# Patient Record
Sex: FEMALE | URBAN - METROPOLITAN AREA
[De-identification: names, ages, dates, MRNs, and addresses within clinical notes are randomized per-mention and may not be internally consistent; named-entity substitution may affect disease eponyms.]

---

## 2023-04-13 ENCOUNTER — OFFICE VISIT (OUTPATIENT)
Facility: LOCATION | Age: 73
End: 2023-04-13
Payer: MEDICARE

## 2023-04-13 DIAGNOSIS — H04.123 DRY EYE SYNDROME OF BILATERAL LACRIMAL GLANDS: ICD-10-CM

## 2023-04-13 DIAGNOSIS — H26.493 OTHER SECONDARY CATARACT, BILATERAL: ICD-10-CM

## 2023-04-13 DIAGNOSIS — H40.1131 PRIMARY OPEN-ANGLE GLAUCOMA, BILATERAL, MILD STAGE: Primary | ICD-10-CM

## 2023-04-13 PROCEDURE — 92133 CPTRZD OPH DX IMG PST SGM ON: CPT | Performed by: OPHTHALMOLOGY

## 2023-04-13 PROCEDURE — 99215 OFFICE O/P EST HI 40 MIN: CPT | Performed by: OPHTHALMOLOGY

## 2023-04-13 RX ORDER — METOPROLOL SUCCINATE 50 MG/1
50 MG TABLET, FILM COATED, EXTENDED RELEASE ORAL AS DIRECTED
Qty: 90 | Refills: 0 | Status: ACTIVE
Start: 2023-04-13

## 2023-04-13 RX ORDER — FENOFIBRATE 48 MG/1
48 MG TABLET, FILM COATED ORAL AS DIRECTED
Qty: 90 | Refills: 0 | Status: ACTIVE
Start: 2023-04-13

## 2023-04-13 ASSESSMENT — INTRAOCULAR PRESSURE
OD: 15
OS: 15

## 2023-04-13 NOTE — IMPRESSION/PLAN
Impression: Examination revealed posterior capsule opacification. 1+ PCO OU. Patient is happy with vision at this time. Plan: Will revisit once patient is visually bothered by PCO. monthly or less

## 2023-04-13 NOTE — IMPRESSION/PLAN
Impression: Examination revealed dry eye syndrome secondary to tear deficiencies. Inf 2+ SPK OU. Plan: Recommend patient to use ATs regularly.

## 2023-04-13 NOTE — IMPRESSION/PLAN
Impression: 4 month follow up with OCT/ONH OU. HPI 09/2022: Patient reports diagnosed with glaucoma at [facility] 3 years ago and was started on Latanoprost OU only. Patient had iStent OU during cataract surgery. Denies any other glaucoma treatment or procedure. POHx: POAG mild OU (Dx 2019), s/p CEIOL/iStent inject OU 2019/20, (-) ocular/head trauma. FOHx: (-) glaucoma PMHx: HTN, HDL, RAMÍREZ, Hearing loss, depression. Eye medications: Latanoprost QHS OU. Tmax: Not established. Target IOP: Not established. Plan: Testing:
OCT/ONH 04/2023: OD bdl thin IN (ST NFL edema), OS bdl thin IT. Stable OU. HVF 24-2 12/2022: OD normal, OS r/o SNS. Baseline. Pachy: 500/503. Gonio 09/2022: SS 2+ 360 OU. 2 N iStent in SS OU. Today:
IOP acceptable OU. OCT/ONH performed today and reviewed. Informed patient of stable examination and glaucoma testing. Plan: 
Continue Latanoprost QHS OU. RTC in 6 months with HVF 24-2 SF OU and gonio. If stable examination, will continue to follow up annually.

## 2023-12-14 ENCOUNTER — OFFICE VISIT (OUTPATIENT)
Facility: LOCATION | Age: 73
End: 2023-12-14
Payer: MEDICARE

## 2023-12-14 DIAGNOSIS — H04.123 DRY EYE SYNDROME OF BILATERAL LACRIMAL GLANDS: ICD-10-CM

## 2023-12-14 DIAGNOSIS — H40.1131 PRIMARY OPEN-ANGLE GLAUCOMA BILATERAL MILD STAGE: Primary | ICD-10-CM

## 2023-12-14 DIAGNOSIS — H26.493 OTHER SECONDARY CATARACT, BILATERAL: ICD-10-CM

## 2023-12-14 PROCEDURE — 92083 EXTENDED VISUAL FIELD XM: CPT | Performed by: OPHTHALMOLOGY

## 2023-12-14 PROCEDURE — 99214 OFFICE O/P EST MOD 30 MIN: CPT | Performed by: OPHTHALMOLOGY

## 2023-12-14 PROCEDURE — 92020 GONIOSCOPY: CPT | Performed by: OPHTHALMOLOGY

## 2023-12-14 RX ORDER — LATANOPROST 50 UG/ML
0.005 % SOLUTION OPHTHALMIC
Qty: 7.5 | Refills: 3 | Status: INACTIVE
Start: 2023-12-14 | End: 2024-12-12

## 2023-12-14 ASSESSMENT — INTRAOCULAR PRESSURE
OD: 14
OS: 14

## 2024-12-13 ENCOUNTER — OFFICE VISIT (OUTPATIENT)
Facility: LOCATION | Age: 74
End: 2024-12-13
Payer: MEDICARE

## 2024-12-13 DIAGNOSIS — H26.493 OTHER SECONDARY CATARACT, BILATERAL: ICD-10-CM

## 2024-12-13 DIAGNOSIS — H04.123 DRY EYE SYNDROME OF BILATERAL LACRIMAL GLANDS: ICD-10-CM

## 2024-12-13 DIAGNOSIS — H40.1131 PRIMARY OPEN-ANGLE GLAUCOMA BILATERAL MILD STAGE: Primary | ICD-10-CM

## 2024-12-13 PROCEDURE — 92133 CPTRZD OPH DX IMG PST SGM ON: CPT | Performed by: OPHTHALMOLOGY

## 2024-12-13 PROCEDURE — 99214 OFFICE O/P EST MOD 30 MIN: CPT | Performed by: OPHTHALMOLOGY

## 2024-12-13 PROCEDURE — 92020 GONIOSCOPY: CPT | Performed by: OPHTHALMOLOGY

## 2024-12-13 RX ORDER — LATANOPROST 50 UG/ML
0.005 % SOLUTION OPHTHALMIC
Qty: 7.5 | Refills: 3 | Status: ACTIVE
Start: 2024-12-13

## 2024-12-13 ASSESSMENT — INTRAOCULAR PRESSURE
OD: 13
OS: 13